# Patient Record
Sex: MALE | URBAN - METROPOLITAN AREA
[De-identification: names, ages, dates, MRNs, and addresses within clinical notes are randomized per-mention and may not be internally consistent; named-entity substitution may affect disease eponyms.]

---

## 2020-10-14 ENCOUNTER — OFFICE VISIT (OUTPATIENT)
Dept: URBAN - METROPOLITAN AREA CLINIC 35 | Facility: CLINIC | Age: 60
End: 2020-10-14

## 2020-10-14 VITALS
TEMPERATURE: 97.9 F | HEIGHT: 65 IN | WEIGHT: 165 LBS | DIASTOLIC BLOOD PRESSURE: 74 MMHG | SYSTOLIC BLOOD PRESSURE: 128 MMHG | BODY MASS INDEX: 27.49 KG/M2

## 2020-10-14 PROBLEM — 707724006: Status: ACTIVE | Noted: 2020-10-13

## 2020-10-14 PROBLEM — 197321007 FATTY LIVER: Status: ACTIVE | Noted: 2020-10-14

## 2020-10-14 RX ORDER — TICAGRELOR 60 MG/1
1 TABLET TABLET ORAL TWICE A DAY
Qty: 60 | Status: ACTIVE | COMMUNITY

## 2020-10-14 RX ORDER — OMEGA-3-ACID ETHYL ESTERS 1 G/1
2 CAPSULES CAPSULE, LIQUID FILLED ORAL TWICE A DAY
Qty: 120 | Status: ACTIVE | COMMUNITY

## 2020-10-14 RX ORDER — CARVEDILOL 12.5 MG/1
1 TABLET WITH FOOD TABLET, FILM COATED ORAL TWICE A DAY
Qty: 60 | Status: ACTIVE | COMMUNITY

## 2020-10-14 RX ORDER — ATORVASTATIN CALCIUM 20 MG/1
1 TABLET TABLET, FILM COATED ORAL ONCE A DAY
Qty: 30 | Status: ACTIVE | COMMUNITY

## 2020-10-14 RX ORDER — FOLIC ACID/MULTIVIT,IRON,MINER 0.4MG-18MG
1 CAPSULE TABLET ORAL ONCE A DAY
Qty: 30 | Status: ACTIVE | COMMUNITY

## 2020-10-14 RX ORDER — LISINOPRIL 20 MG/1
1 TABLET TABLET ORAL ONCE A DAY
Qty: 30 | Status: ACTIVE | COMMUNITY

## 2020-10-14 NOTE — HPI-MIGRATED HPI
;   ;     Fatty Liver : He was found to have fatty liver via US Abdomen performed while inpatient at [facility] on (7/25/2019) revealing 1. Cholelithiasis with nonspecific gallbladder wall thickening. Findings are indeterminate, but raise suspicion for acute cholecystitis in the appropriate clinical setting. Reportedly negative sonographic Cook's sign, which may be obscured if the patient has recently received pain medication; recommend clinical correlation. If there are equivocal signs or symptoms, further evaluation with nuclear medicine hepatobiliary scan could be considered as clinically warranted. 2. Hepatic steatosis.    CT Abdomen and Pelvis with contrast (7/19/2019) 1. Cholelithiasis with nonspecific gallbladder wall thickening and pericholecystic infiltrative changes. Findings are nonspecific and could be secondary to underlying hepatic dysfunction but raise suspicion for possible acute cholecystitis in the appropriate clinical setting. Recommend further evaluation with dedicated right upper quadrant sonography. Hepatic steatosis.  2. New mild CBD dilatation to 9 mm without obstructing radiopaque choledocholithiasis. Further evaluation with dedicated MRI/MRCP could be considered as clinically warranted to evaluate for obstructive etiology.;   Elevated Liver Enzymes : 58 y/o male patient presents today for a consultation for elevated liver enzymes. Patient admits a recent onset of elevated liver enzymes on March 3, 2020 via labs with Cardiologist  Dr. Parekh revealing CMP: AST (High) 85, ALT (High) 141.   Most recent labs were completed with  Dr. Parekh on (9/22/2020) CMP: AST (High) 67, ALT (High) 74   He denies a prior history of elevated LFT's.  Patient currently denies  jaundice, chills, RUQ pains, dizziness, easy bruising, or increase fatigue.   RISK FACTORS:  Admits a 20 year history of Alcohol use with drinking a beer or glass of wine nightly, then occasional alcohol use until 3 weeks ago, at which time he stopped. travel outside the [place] to [place] in (2018).   Denies illicit drug use, NSAID's,  Protein supplements, tattoos, piercing's,  service, blood transfusion, family history of liver disease or cancer, personal exposure to liver diseases, prison, or rehab.     Outside labs: Dr. Parekh   (9/22/2020)  AST (High) 67, ALT (High) 74, AST (High) 67, Albumin (WNL) 4.4. Total Bilirubin (WNL) 0.7, Alk Phos (WNL) 115.  Triglycerides (High) 210, HDL Cholesterol (High) 30, LDL Cholesterol (High) 100  (7/15/2020) CMP: AST (High) 58, Alk PHOS (WNL) 103, Albumin (WNL) 4.3, Bilirubin Total (WNL) 0.7  (3/02/2020) CMP: AST (High) 85, ALT (High) 141, Albumin (WNL) 4.6, Alk phos (WNL) 0.7, Total Bilirubin (WNL) 0.7;

## 2020-11-13 ENCOUNTER — TELEPHONE ENCOUNTER (OUTPATIENT)
Dept: URBAN - METROPOLITAN AREA CLINIC 35 | Facility: CLINIC | Age: 60
End: 2020-11-13

## 2020-11-17 ENCOUNTER — OFFICE VISIT (OUTPATIENT)
Dept: URBAN - METROPOLITAN AREA CLINIC 35 | Facility: CLINIC | Age: 60
End: 2020-11-17

## 2020-11-17 NOTE — HPI-MIGRATED HPI
;   ;     Fatty Liver : He was found to have fatty liver via US Abdomen performed while inpatient at [facility] on (7/25/2019) revealing 1. Cholelithiasis with nonspecific gallbladder wall thickening. Findings are indeterminate, but raise suspicion for acute cholecystitis in the appropriate clinical setting. Reportedly negative sonographic Cook's sign, which may be obscured if the patient has recently received pain medication; recommend clinical correlation. If there are equivocal signs or symptoms, further evaluation with nuclear medicine hepatobiliary scan could be considered as clinically warranted. 2. Hepatic steatosis.    CT Abdomen and Pelvis with contrast (7/19/2019) 1. Cholelithiasis with nonspecific gallbladder wall thickening and pericholecystic infiltrative changes. Findings are nonspecific and could be secondary to underlying hepatic dysfunction but raise suspicion for possible acute cholecystitis in the appropriate clinical setting. Recommend further evaluation with dedicated right upper quadrant sonography. Hepatic steatosis.  2. New mild CBD dilatation to 9 mm without obstructing radiopaque choledocholithiasis. Further evaluation with dedicated MRI/MRCP could be considered as clinically warranted to evaluate for obstructive etiology.;   Elevated Liver Enzymes : 60 y/o male patient presents today for a consultation for elevated liver enzymes. Patient admits a recent onset of elevated liver enzymes on March 3, 2020 via labs with Cardiologist  Dr. Parekh revealing CMP: AST (High) 85, ALT (High) 141.   Most recent labs were completed with  Dr. Parekh on (9/22/2020) CMP: AST (High) 67, ALT (High) 74   He denies a prior history of elevated LFT's.  Patient currently denies  jaundice, chills, RUQ pains, dizziness, easy bruising, or increase fatigue.   RISK FACTORS:  Admits a 20 year history of Alcohol use with drinking a beer or glass of wine nightly, then occasional alcohol use until 3 weeks ago, at which time he stopped. travel [place] the [place] to [place] in (2018).   Denies illicit drug use, NSAID's,  Protein supplements, tattoos, piercing's,  service, blood transfusion, family history of liver disease or cancer, personal exposure to liver diseases, [facility], or rehab.     Outside labs: Dr. Parekh   (9/22/2020)  AST (High) 67, ALT (High) 74, AST (High) 67, Albumin (WNL) 4.4. Total Bilirubin (WNL) 0.7, Alk Phos (WNL) 115.  Triglycerides (High) 210, HDL Cholesterol (High) 30, LDL Cholesterol (High) 100  (7/15/2020) CMP: AST (High) 58, Alk PHOS (WNL) 103, Albumin (WNL) 4.3, Bilirubin Total (WNL) 0.7  (3/02/2020) CMP: AST (High) 85, ALT (High) 141, Albumin (WNL) 4.6, Alk phos (WNL) 0.7, Total Bilirubin (WNL) 0.7;

## 2020-12-10 ENCOUNTER — OFFICE VISIT (OUTPATIENT)
Dept: URBAN - METROPOLITAN AREA CLINIC 33 | Facility: CLINIC | Age: 60
End: 2020-12-10

## 2021-12-17 ENCOUNTER — RX ONLY (RX ONLY)
Age: 61
End: 2021-12-17

## 2021-12-17 ENCOUNTER — NAIL CHANGES (OUTPATIENT)
Dept: URBAN - METROPOLITAN AREA CLINIC 32 | Facility: CLINIC | Age: 61
Setting detail: DERMATOLOGY
End: 2021-12-17

## 2021-12-17 DIAGNOSIS — D22.5 MELANOCYTIC NEVI OF TRUNK: ICD-10-CM

## 2021-12-17 DIAGNOSIS — Z85.820 PERSONAL HISTORY OF MALIGNANT MELANOMA OF SKIN: ICD-10-CM

## 2021-12-17 PROBLEM — L604 703.8: Status: ACTIVE | Noted: 2021-12-17

## 2021-12-17 PROBLEM — L601 703.8: Status: ACTIVE | Noted: 2021-12-17

## 2021-12-17 PROBLEM — L608 703.8: Status: ACTIVE | Noted: 2021-12-17

## 2021-12-17 PROBLEM — L602 703.8: Status: ACTIVE | Noted: 2021-12-17

## 2021-12-17 PROBLEM — L603 703.8: Status: ACTIVE | Noted: 2021-12-17

## 2021-12-17 PROBLEM — L03019 681.02: Status: ACTIVE | Noted: 2021-12-17

## 2021-12-17 PROCEDURE — 99204 OFFICE O/P NEW MOD 45 MIN: CPT

## 2021-12-17 RX ORDER — DOXYCYCLINE 100 MG/1
1 TABLET TABLET, FILM COATED ORAL TWICE A DAY
Qty: 28 | Refills: 0
Start: 2021-12-17

## 2022-01-19 ENCOUNTER — RX ONLY (RX ONLY)
Age: 62
End: 2022-01-19

## 2022-01-19 ENCOUNTER — FOLLOW UP (OUTPATIENT)
Dept: URBAN - METROPOLITAN AREA CLINIC 34 | Facility: CLINIC | Age: 62
Setting detail: DERMATOLOGY
End: 2022-01-19

## 2022-01-19 DIAGNOSIS — D22.5 MELANOCYTIC NEVI OF TRUNK: ICD-10-CM

## 2022-01-19 PROBLEM — L60.9 NAIL DISORDER, UNSPECIFIED: Status: RESOLVED | Noted: 2022-01-19

## 2022-01-19 PROBLEM — B35.1 TINEA UNGUIUM: Status: ACTIVE | Noted: 2022-01-19

## 2022-01-19 PROBLEM — L60.9 NAIL DISORDER, UNSPECIFIED: Status: ACTIVE | Noted: 2022-01-19

## 2022-01-19 PROBLEM — B35.1 TINEA UNGUIUM: Status: RESOLVED | Noted: 2022-01-19

## 2022-01-19 PROCEDURE — 99214 OFFICE O/P EST MOD 30 MIN: CPT

## 2022-01-19 RX ORDER — ITRACONAZOLE 100 MG/1
2 CAPSULE CAPSULE ORAL AS DIRECTED
Qty: 84 | Refills: 0
Start: 2022-01-19